# Patient Record
Sex: FEMALE | Race: WHITE | NOT HISPANIC OR LATINO | Employment: UNEMPLOYED | URBAN - METROPOLITAN AREA
[De-identification: names, ages, dates, MRNs, and addresses within clinical notes are randomized per-mention and may not be internally consistent; named-entity substitution may affect disease eponyms.]

---

## 2024-05-24 ENCOUNTER — LACTATION CONSULT (OUTPATIENT)
Dept: LACTATION | Facility: CLINIC | Age: 29
End: 2024-05-24
Payer: COMMERCIAL

## 2024-05-24 DIAGNOSIS — O92.70 LACTATION PROBLEM (HHS-HCC): Primary | ICD-10-CM

## 2024-05-24 NOTE — LACTATION NOTE
Lactation Counseling Note    Subjective:    Kaycee Jay is a 28 y.o. patient referred for lactation counseling. She is here today due to Hyperlactation and post frenototmy . She was referred by her  self .     OB HISTORY:   Baby Name: Siri  Healthcare Provider: [facility]  /Para: : 2  Para: 2  Weeks Gestation: 39  Mode of Delivery: Normal vaginal route  Induction/Augmentation  Epidural/General Anesthesia  Delivery Complications: None  Maternal Complications: None   Information: Baby's Name: Siri  : 24  Place of Birth: [facility]  Healthcare Provider: Less  Skin to skin contact: First hour  Birth weight: 6#14oz    Objective:    BREASTFEEDING ASSESSMENT:   Physical Exam    Baby Name: Siri  Breast Assessment: Large  Nipple Assessment/Stage: intact n/a  Areola: Normal  Feeding Position: baby - led, cradle, cross - cradle, and one side per feeding  Latch: minimal assistance is needed, instructed on deep latch, areolar attachment, sucking and swallowing, and frequent audible swallows  Suck/Feeding: sustained and choking/gulping  Infant Feeding Method: Breast milk only  Infant Behavior: quiet alert and content after feeding  Infant Information: Post status frenotomy  Weight: Weight before feedin#4.2oz  Weight after feedin#7oz  Amount of breast milk transferred: 3oz ml    PATIENT DISCUSSION SUMMARY:Mom here with 1 month old baby for follow up, post frenotomy. Reviewed stretches, per mother's request. Mom c/o baby fussy anf gassy. Has been pumping prior to feeds at recommendation of  in [place]. Has hx of oversupply and fast letdown. Educated re: breastmilk supply/demand. Suggested she try hand expressing/hand pump just unitl letdown occurs, or take baby off breast at signs of letdown then re-latch. Also discussed laidback nusring. Baby tx 3 oz at breast. Discussed diet concerns, mom is dairy free. Offered support and encouragement. Follow up Tuesday,   breastfeeding on demand.    LACTATION PROBLEMS AND STRATEGIES:  Foremilk/Hindmilk imbalance: Express for 2-3 minutes before putting baby on breast  Feed from only one breast at each nursing session  Make sure baby finishes first breast before switching to second. Do not monitor by the clock  Watch for symptoms: Stools are bright green and watery, baby is fussy, gassy, and may have poor weight gain  N/a  Hyperlactation: Avoid long periods between feedings  Block feeding -offer same breast for 3 hours  Burp baby frequently and pace feeding  Consult with physician concerning lactation-inhibiting drugs ( Sudafed 60 mg, Benadryl)  Decrease the frequency of feedings  Discuss use of pseudoephedrine with physician  Drink peppermint tea to lower breast milk supply  If breast uncomfortable, express just enough to soften  Lean back or use reclining position during feedings  Massage any plugged milk ducts during feeding  P1-164 Breastfeeding: Plugged Milk Ducts/Mastitis given  Place baby in upright position facing your breast  Refer mother to health provider  Use only one breast at each feeding  Use the same breast for several feedings  Utilize Australian hold as a method to slow the flow of milk at the breast through decreasing the gravity factor  Utilize a nipple shield to funnel the milk at the breast so baby can keep up with the flow  Utilize paced feeding allowing the baby to come off the breast to catch her breath as needed  PATIENT INSTRUCTION HANDOUTS GIVEN:   N/a  LACTATION EDUCATION:  Waking Techniques, Correct Positioning, and Correct Latch On

## 2024-05-28 ENCOUNTER — LACTATION CONSULT (OUTPATIENT)
Dept: LACTATION | Facility: CLINIC | Age: 29
End: 2024-05-28
Payer: COMMERCIAL

## 2024-05-28 PROCEDURE — 99211 OFF/OP EST MAY X REQ PHY/QHP: CPT | Performed by: EMERGENCY MEDICINE

## 2024-05-29 ENCOUNTER — LACTATION CONSULT (OUTPATIENT)
Dept: LACTATION | Facility: CLINIC | Age: 29
End: 2024-05-29
Payer: COMMERCIAL

## 2024-05-29 DIAGNOSIS — O92.70 LACTATION PROBLEM (HHS-HCC): Primary | ICD-10-CM

## 2024-05-29 PROCEDURE — 99211 OFF/OP EST MAY X REQ PHY/QHP: CPT | Performed by: EMERGENCY MEDICINE

## 2024-05-29 NOTE — PATIENT INSTRUCTIONS
Mom here for follow up wound check and weight check.  Wound healing well rev stretches again rev prescribed serapeptase application per dentist to prevent scar tissue.  Infant with difficulty latching due to full breast rev compression until let down and baby latched easier.  Baby transfer 4.8 oz from both breasts.  Mom returning to [place] tomorrow mom to call with questions and follow up with peds for wt checks

## 2024-05-29 NOTE — PROGRESS NOTES
Lactation Counseling Note    Subjective:    Kaycee Jay is a 28 y.o. patient referred for lactation counseling. She is here today due to Latch issues. She was referred by her  self .     OB HISTORY:   Mode of Delivery: Normal vaginal route    Objective:    BREASTFEEDING ASSESSMENT:   Physical Exam    Breast Assessment: Large, Symmetrical, Full, Soft, Warm, and Compressible  Nipple Assessment/Stage: intact, short, rounded after feeding, and sore Stage I - Pain or irritation with no skin breakdown  Areola: Normal  Feeding Position: baby - led, cross - cradle, laid back, skin to skin, one side per feeding, and mother demonstrates good positioning  Latch: minimal assistance is needed, instructed on deep latch, deep latch obtained, mouth open/moves head side to side, wide open mouth < 160, comfortable with no pain, sucking and swallowing, flanged lips, chin moves in rhythmic motion, comfortable latch, and frequent audible swallows  Suck/Feeding: sustained, audible swallowing, and content after feeding  Infant Feeding Method: Breast milk only  Infant Behavior: awake, active alert, readiness to feed, feeding cues observed, sucking, and content after feeding  Infant Information: Post status frenotomy  Receding chin  Poor occlusion of lips around areola  Tongue protrudes over alveolar ridge  Good cupping of tongue  Good lateral movement of the tongue  Nipple Pain: Pain scale 0-10 (10 most pain): 0  Weight: Weight before feedinlbs 13.3oz  Weight after feedinlbs 2.1oz  Amount of breast milk transferred: 4.8oz ml  Number of voids in the last 24 hours: 8-9  Number of stools in the last 24 hours: 4-6    PATIENT DISCUSSION SUMMARY:  Mom here for follow up wound check and weight check.  Wound healing well rev stretches again rev prescribed serapeptase application per dentist to prevent scar tissue.  Infant with difficulty latching due to full breast rev compression until let down and baby latched easier.  Baby transfer 4.8  oz from both breasts.  Mom returning to [place] tomorrow mom to call with questions and follow up with peds for wt checks  LACTATION PROBLEMS AND STRATEGIES:  Problems latching baby to breast: Baby should latch to areola (dark area) not just the nipple.  Baby's lips should be flanged outward.  Bring the baby up to the level of your breast by putting a pillow under the baby.  Do not use bottles or pacifiers until latching on well.  Dribble milk over the nipple or express milk so that baby can taste it  Encourage a deeper latch with the asymetrical latch- lining baby's nose opposite mother's nipple.  Encourage nipple to stand up prior to feeing by pumping, nipple rolling or by brief application of ice.  Gently tickle your baby's lip with your nipple to encourage your baby to open his/her mouth wide.  Hold baby so that your breast is positioned deep in the baby's mouth.  Hold your baby close, tummy to tummy.  If baby does not latch to breast, express breast milk.  If engorged, express some milk to soften breast.  If the baby is not latched on well, break seal and gently try again.  Keep baby skin to skin and watch for feeding cues.  Massage breast to start milk-ejection reflex.  Place nipple and at least 1 inch of areola into baby's mouth.  Place your hand behind the baby's neck and shoulder.  Do not force baby's head into breast.  Put baby in the football hold or clutch hold, supporting his/her neck and head in sniffing position to open his/her throat.  Shape breast into oval shape (sandwich hold)  for deep latch.  Try different feeding positions (cradle, football, side etc.).  Use a breast feeding pillow to bring baby up to the level of the breast.  Use nipple shield and monitor baby's weight gain and output.  Use semi-reclined feeding position to allow baby to take an active role and trigger baby's inborn feeding behavior.  Use your hand to support your breast during feeding.  When your baby's mouth is wide open,  quickly pull baby into your breast.  Your baby's chin should be pressed into your breast.  PATIENT INSTRUCTION HANDOUTS GIVEN:      LACTATION EDUCATION:  Waking Techniques, Correct Positioning, and Correct Latch On

## 2024-06-07 NOTE — PATIENT INSTRUCTIONS
Lynne Waddell, RN  Registered Nurse  Specialty: Lactation Services     Lactation Note     Signed     Encounter Date: 2024     Signed         Lactation Counseling Note     Subjective:     Kaycee Jay is a 28 y.o. patient referred for lactation counseling. She is here today due to Hyperlactation and post frenototmy . She was referred by her  self .      OB HISTORY:   Baby Name: Siri  Healthcare Provider: OB/GYN [facility]  /Para: : 2  Para: 2  Weeks Gestation: 39  Mode of Delivery: Normal vaginal route  Induction/Augmentation  Epidural/General Anesthesia  Delivery Complications: None  Maternal Complications: None   Information: Baby's Name: Siri  : 24  Place of Birth: [facility]  Healthcare Provider: Less  Skin to skin contact: First hour  Birth weight: 6#14oz     Objective:     BREASTFEEDING ASSESSMENT:   Physical Exam     Baby Name: Siri  Breast Assessment: Large  Nipple Assessment/Stage: intact n/a  Areola: Normal  Feeding Position: baby - led, cradle, cross - cradle, and one side per feeding  Latch: minimal assistance is needed, instructed on deep latch, areolar attachment, sucking and swallowing, and frequent audible swallows  Suck/Feeding: sustained and choking/gulping  Infant Feeding Method: Breast milk only  Infant Behavior: quiet alert and content after feeding  Infant Information: Post status frenotomy  Weight: Weight before feedin#4.2oz  Weight after feedin#7oz  Amount of breast milk transferred: 3oz ml     PATIENT DISCUSSION SUMMARY:Mom here with 1 month old baby for follow up, post frenotomy. Reviewed stretches, per mother's request. Mom c/o baby fussy anf gassy. Has been pumping prior to feeds at recommendation of  in [place]. Has hx of oversupply and fast letdown. Educated re: breastmilk supply/demand. Suggested she try hand expressing/hand pump just unitl letdown occurs, or take baby off breast at signs of letdown then re-latch. Also discussed  laidback nuok. Baby tx 3 oz at breast. Discussed diet concerns, mom is dairy free. Offered support and encouragement. Follow up Tuesday, continue breastfeeding on demand.     LACTATION PROBLEMS AND STRATEGIES:  Foremilk/Hindmilk imbalance: Express for 2-3 minutes before putting baby on breast  Feed from only one breast at each nursing session  Make sure baby finishes first breast before switching to second. Do not monitor by the clock  Watch for symptoms: Stools are bright green and watery, baby is fussy, gassy, and may have poor weight gain  N/a  Hyperlactation: Avoid long periods between feedings  Block feeding -offer same breast for 3 hours  Burp baby frequently and pace feeding  Consult with physician concerning lactation-inhibiting drugs ( Sudafed 60 mg, Benadryl)  Decrease the frequency of feedings  Discuss use of pseudoephedrine with physician  Drink peppermint tea to lower breast milk supply  If breast uncomfortable, express just enough to soften  Lean back or use reclining position during feedings  Massage any plugged milk ducts during feeding  P1-164 Breastfeeding: Plugged Milk Ducts/Mastitis given  Place baby in upright position facing your breast  Refer mother to health provider  Use only one breast at each feeding  Use the same breast for several feedings  Utilize Australian hold as a method to slow the flow of milk at the breast through decreasing the gravity factor  Utilize a nipple shield to funnel the milk at the breast so baby can keep up with the flow  Utilize paced feeding allowing the baby to come off the breast to catch her breath as needed  PATIENT INSTRUCTION HANDOUTS GIVEN:   N/a  LACTATION EDUCATION:  Waking Techniques, Correct Positioning, and Correct Latch On                                                                                                                                          Electronically signed by Lynne Waddell RN at 5/24/2024  3:38 PM         Lactation Consult on  5/24/2024          Note shared with patient  Additional Documentation    Flowsheets: Interfaced Flowsheet Data   Encounter Info: Billing Info,     History,     Allergies     Orders Placed    None  Medication Changes      None  Medication List  Visit Diagnoses      None  Problem List

## 2024-06-26 ENCOUNTER — LACTATION CONSULT (OUTPATIENT)
Dept: LACTATION | Facility: CLINIC | Age: 29
End: 2024-06-26
Payer: COMMERCIAL

## 2024-06-26 DIAGNOSIS — O92.70 LACTATION PROBLEM (HHS-HCC): Primary | ICD-10-CM

## 2024-07-02 NOTE — PROGRESS NOTES
Lactation Counseling Note    Subjective:    Kaycee Jay is a 28 y.o. patient referred for lactation counseling. She is here today due to Latch issues. She was referred by her  self .     OB HISTORY:   Mode of Delivery: Normal vaginal route    Objective:    BREASTFEEDING ASSESSMENT:   Physical Exam    Breast Assessment: Large, Pendulous, Full, Warm, and Compressible  Nipple Assessment/Stage: intact, erect, and rounded after feeding    Areola: Normal  Feeding Position: baby - led, cradle, laid back, skin to skin, one side per feeding, nipple to nose, and mother demonstrates good positioning  Latch: instructed on deep latch, deep latch obtained, optimal angle of mouth opening, comfortable with no pain, bursts of sucking, swallowing, and rest, flanged lips, clenched jaws, and frequent audible swallows  Suck/Feeding: unsustained, audible swallowing, and content after feeding  Alternative Feeding Methods: nursing at the breast  Infant Feeding Method: Breast milk only  Infant Behavior: awake, active alert, fussy, feeding cues observed, and content after feeding  Infant Information: Ankyloglossia  Receding chin  Poor occlusion of lips around areola  Tongue humped/bunched/retracted/elevated  Nipple Pain: Pain scale 0-10 (10 most pain): 0  Weight: Date of pediatrician weight:    Weight before feeding: ***  Weight after feeding: ***  Amount of breast milk transferred: *** ml  Number of voids in the last 24 hours: 8  Number of stools in the last 24 hours: 3    PATIENT DISCUSSION SUMMARY:    LACTATION PROBLEMS AND STRATEGIES:  Fussy baby: Carry baby with his/her tummy down across forearm with hand supporting chest  Check the baby for tight clothing  Frequent burping during feedings may help if baby seem to have gas  Give the baby a gentle message  Increase holding and carrying of baby  Mom and baby may take a warm bath together   Normal fussy period, usually during late afternoon or early evening  Nurse baby at least 10-12  "times per day. Watch for feeding cues. Do not wait until baby cries to feed  Nurse on demand. Try to avoid too rigid of a schedule  Provide lots of time skin to skin with baby  Reduce environmental stimuli (loud sounds, bright lights, etc.)  Swaddle in sleep sack  Turn dryer, vacuum, or shower on for \"white\" noise  PATIENT INSTRUCTION HANDOUTS GIVEN:      LACTATION EDUCATION:  Correct Positioning and Correct Latch On                                                                                            "

## 2024-07-09 ENCOUNTER — LACTATION CONSULT (OUTPATIENT)
Dept: LACTATION | Facility: CLINIC | Age: 29
End: 2024-07-09
Payer: COMMERCIAL

## 2024-07-09 PROCEDURE — 99211 OFF/OP EST MAY X REQ PHY/QHP: CPT | Performed by: EMERGENCY MEDICINE

## 2024-07-09 NOTE — PROGRESS NOTES
Lactation Counseling Note    Subjective:    Kaycee Jay is a 28 y.o. patient referred for lactation counseling. She is here today due to post frenotomy wound check. She was referred by her  self .     OB HISTORY:   Mode of Delivery: Normal vaginal route    Objective:    BREASTFEEDING ASSESSMENT:   Physical Exam    Breast Assessment: Large, Symmetrical, Full, Soft, Warm, and Compressible  Nipple Assessment/Stage: intact, short, and rounded after feeding    Areola: Normal  Feeding Position: baby - led, cradle, laid back, skin to skin, one side per feeding, one sided nursing, nipple to nose, and mother demonstrates good positioning  Latch: latch achieved, comfortable with no pain, sucking and swallowing, flanged lips, comfortable latch, and correct tongue position  Suck/Feeding: sustained, audible swallowing, and content after feeding  Infant Behavior: awake, active alert, readiness to feed, and content after feeding  Nipple Pain: Pain scale 0-10 (10 most pain): 0  Weight: Weight before feedinlbs 12.5oz  Weight after feedinlbs 0.9oz  Amount of breast milk transferred: 4.4oz ml    PATIENT DISCUSSION SUMMARY: Mom here for follow-up weight check and assessment of second frenectomy wound mom states baby was rerevised   States the wound had healed over and was able to release the tongue mom states she notices a difference in baby's gassiness assessed the wound wound peers to be clean and open baby having decreased gas symptoms.  Mom wants to come to assess the wound daily for reattachment.  Baby's weight is up 15 ounces in the past 13 days baby did transfer 4.4 ounces.  Mom to follow-up tomorrow July 10.    LACTATION PROBLEMS AND STRATEGIES:     PATIENT INSTRUCTION HANDOUTS GIVEN:      LACTATION EDUCATION:  Correct Latch On

## 2024-07-10 ENCOUNTER — LACTATION CONSULT (OUTPATIENT)
Dept: LACTATION | Facility: CLINIC | Age: 29
End: 2024-07-10
Payer: COMMERCIAL

## 2024-07-10 PROCEDURE — 99211 OFF/OP EST MAY X REQ PHY/QHP: CPT | Performed by: EMERGENCY MEDICINE

## 2024-07-10 NOTE — PROGRESS NOTES
Lactation Counseling Note    Subjective:    Kaycee Jay is a 28 y.o. patient referred for lactation counseling. She is here today due to  frenectomy wound assessment . She was referred by her  self .     OB HISTORY:   Mode of Delivery: Normal vaginal route    Objective:    BREASTFEEDING ASSESSMENT:   Physical Exam    Breast Assessment: Large, Pendulous, Symmetrical, Full, Soft, Warm, and Compressible  Nipple Assessment/Stage: intact and erect    Areola: Normal  Feeding Position: cradle, cross - cradle, skin to skin, one side per feeding, one sided nursing, and mother demonstrates good positioning  Suck/Feeding: sustained, audible swallowing, choking/gulping, and content after feeding  Alternative Feeding Methods: nursing at the breast  Infant Feeding Method: Breast milk only  Infant Behavior: awake, quiet alert, readiness to feed, and content after feeding  Infant Information: Post status frenotomy  Tongue protrudes over alveolar ridge  Good cupping of tongue  Good lateral movement of the tongue  Able to elevate tongue to roof of mouth  Nipple Pain: Pain scale 0-10 (10 most pain): 0  Weight: Weight before feedin.lbs13.1oz  Weight after feedinlbs 1.0oz  Amount of breast milk transferred: 3.9oz ml    PATIENT DISCUSSION SUMMARY:  Mom here for follow-up weight check and wound assessment post revision baby up 2 ounces in 24 hours mom denies any pain or discomfort with nursing Dimon under tongue appears to be clean with clear edges no attachment noted observed very comfortable feeding mom denies any pain or problems mom states baby is less gassy.  Mom feels that stretches are going well.  Mom wants to follow-up on Monday before she follows up at [facility] on Tuesday.  LACTATION PROBLEMS AND STRATEGIES:     PATIENT INSTRUCTION HANDOUTS GIVEN:      LACTATION EDUCATION:  Correct Latch On

## 2024-07-10 NOTE — PATIENT INSTRUCTIONS
Mom here for follow-up weight check and wound assessment post revision baby up 2 ounces in 24 hours mom denies any pain or discomfort with nursing Dimon under tongue appears to be clean with clear edges no attachment noted observed very comfortable feeding mom denies any pain or problems mom states baby is less gassy.  Mom feels that stretches are going well.  Mom wants to follow-up on Monday before she follows up at [facility] on Tuesday.   left-side extremities/right-side extremities/normal

## 2024-07-12 ENCOUNTER — LACTATION CONSULT (OUTPATIENT)
Dept: LACTATION | Facility: CLINIC | Age: 29
End: 2024-07-12
Payer: COMMERCIAL

## 2024-07-12 DIAGNOSIS — O92.70 LACTATION PROBLEM (HHS-HCC): Primary | ICD-10-CM

## 2024-07-12 PROCEDURE — 99211 OFF/OP EST MAY X REQ PHY/QHP: CPT | Performed by: EMERGENCY MEDICINE

## 2024-07-12 NOTE — PATIENT INSTRUCTIONS
Mom and baby here for follow up post rerevision. Mom states latch comfort much improved. Infant weighed prior to feed at 11 lb 15.7 oz which is gain of 2.6 in 3 days. Aftercare stretches done per mom request. Wound looks appropriately lorena shaped. Observed mom independently latch baby without difficult. Latch comfortable. Infant weighed after one side and transferred 4.1 oz per test weight. Mom states infant usually only nurses on one side per feeding. Infant content after feeding. Mom would like to return Monday 7-15-24 for continued follow up

## 2024-07-12 NOTE — PROGRESS NOTES
Lactation Counseling Note    Subjective:    Kaycee Jay is a 28 y.o. patient referred for lactation counseling. She is here today due to Latch issues. She was referred by her  self, prior patient .     OB HISTORY:   /Para: : 2  Para: 2    Objective:    BREASTFEEDING ASSESSMENT:   Physical Exam    Breast Assessment: Large, Pendulous, Full, Compressible, and Readiness to feed  Nipple Assessment/Stage: intact, short, erect, and rounded after feeding none, improved  Areola: Normal  Feeding Position: baby - led, c - hold, cross - cradle, laid back, skin to skin, one side per feeding, nipple to nose, and mother demonstrates good positioning  Latch: eagerly grasped on to latch, areolar attachment, comfortable with no pain, sucking and swallowing, chin and lower lip contact breast first, chin moves in rhythmic motion, comfortable latch, and correct tongue position  Suck/Feeding: sustained, coordinated suck/swallow/breathe, audible swallowing, audible swallowing with stimulation, and content after feeding  Nipple Pain: none  Weight: Weight before feedin lb 15.7 oz  Weight after feedin lb 3.8 oz  Amount of breast milk transferred: 4.1 oz ml    PATIENT DISCUSSION SUMMARY: Mom and baby here for follow up post rerevision. Mom states latch comfort much improved. Infant weighed prior to feed at 11 lb 15.7 oz which is gain of 2.6 in 3 days. Aftercare stretches done per mom request. Wound looks appropriately lorena shaped. Observed mom independently latch baby without difficult. Latch comfortable. Infant weighed after one side and transferred 4.1 oz per test weight. Mom states infant usually only nurses on one side per feeding. Infant content after feeding. Mom would like to return Monday 7-15-24 for continued follow up    LACTATION PROBLEMS AND STRATEGIES:  Problems latching baby to breast: Baby should latch to areola (dark area) not just the nipple.  Baby's lips should be flanged outward.  Bring the baby  up to the level of your breast by putting a pillow under the baby.  Do not use bottles or pacifiers until latching on well.  Dribble milk over the nipple or express milk so that baby can taste it  Encourage a deeper latch with the asymetrical latch- lining baby's nose opposite mother's nipple.  Encourage nipple to stand up prior to feeing by pumping, nipple rolling or by brief application of ice.  Gently tickle your baby's lip with your nipple to encourage your baby to open his/her mouth wide.  Hold baby so that your breast is positioned deep in the baby's mouth.  Hold your baby close, tummy to tummy.  If baby does not latch to breast, express breast milk.  If engorged, express some milk to soften breast.  If the baby is not latched on well, break seal and gently try again.  Keep baby skin to skin and watch for feeding cues.  Massage breast to start milk-ejection reflex.  Place nipple and at least 1 inch of areola into baby's mouth.  Place your hand behind the baby's neck and shoulder.  Do not force baby's head into breast.  Put baby in the football hold or clutch hold, supporting his/her neck and head in sniffing position to open his/her throat.  Shape breast into oval shape (sandwich hold)  for deep latch.  Try different feeding positions (cradle, football, side etc.).  Use a breast feeding pillow to bring baby up to the level of the breast.  Use nipple shield and monitor baby's weight gain and output.  Use semi-reclined feeding position to allow baby to take an active role and trigger baby's inborn feeding behavior.  Use your hand to support your breast during feeding.  When your baby's mouth is wide open, quickly pull baby into your breast.  Your baby's chin should be pressed into your breast.  PATIENT INSTRUCTION HANDOUTS GIVEN:      LACTATION EDUCATION:  Correct Positioning and Correct Latch On

## 2024-07-15 ENCOUNTER — LACTATION CONSULT (OUTPATIENT)
Dept: LACTATION | Facility: CLINIC | Age: 29
End: 2024-07-15
Payer: COMMERCIAL

## 2024-07-15 PROCEDURE — 99211 OFF/OP EST MAY X REQ PHY/QHP: CPT | Performed by: EMERGENCY MEDICINE

## 2024-07-16 NOTE — PATIENT INSTRUCTIONS
Mom here for follow-up post revision of frenectomy.  Wound appears tight and closed helped mom with a deep stretch wound open the easily.  Mom feels symptoms of gassiness have improved.  Has been concerns why wound keeps closing very quickly.  Mom has follow-up with [facility] Tuesday, July 16 baby quickly transferred 5.7 ounces from 1 breast.  Baby refuses to the second breast.  Mom to follow-up as needed

## 2024-07-16 NOTE — PROGRESS NOTES
Lactation Counseling Note    Subjective:    Kaycee Jay is a 28 y.o. patient referred for lactation counseling. She is here today due to  wound assessment . She was referred by her  self .     OB HISTORY:   Mode of Delivery: Normal vaginal route    Objective:    BREASTFEEDING ASSESSMENT:   Physical Exam    Breast Assessment: Large, Pendulous, Symmetrical, Full, Soft, Warm, and Compressible  Nipple Assessment/Stage: intact and erect    Areola: Normal  Feeding Position: baby - led, breast sandwich, cradle, skin to skin, one sided nursing, and nipple to nose  Latch: deep latch obtained, comfortable with no pain, sucks with long jaw movement, flanged lips, and frequent audible swallows  Suck/Feeding: sustained, audible swallowing, and content after feeding  Alternative Feeding Methods: nursing at the breast  Infant Feeding Method: Breast milk only  Infant Behavior: awake, quiet alert, and content after feeding  Infant Information: Post status frenotomy  Good cupping of tongue  Good lateral movement of the tongue  Able to elevate tongue to roof of mouth  Nipple Pain: Pain scale 0-10 (10 most pain): 0  Weight: Weight before feedinlbs 2.1oz  Weight after feedinlbs 7.8oz  Amount of breast milk transferred: 5.7 ml    PATIENT DISCUSSION SUMMARY: Mom here for follow-up post revision of frenectomy.  Wound appears tight and closed helped mom with a deep stretch wound open the easily.  Mom feels symptoms of gassiness have improved.  Has been concerns why wound keeps closing very quickly.  Mom has follow-up with [facility]  baby quickly transferred 5.7 ounces from 1 breast.  Baby refuses to the second breast.  Mom to follow-up as needed.    LACTATION PROBLEMS AND STRATEGIES:  Fast healing frenotomy  PATIENT INSTRUCTION HANDOUTS GIVEN:      LACTATION EDUCATION:  Correct Positioning and Correct Latch On

## 2024-07-17 ENCOUNTER — LACTATION CONSULT (OUTPATIENT)
Dept: LACTATION | Facility: CLINIC | Age: 29
End: 2024-07-17
Payer: COMMERCIAL

## 2024-07-17 DIAGNOSIS — O92.70 LACTATION PROBLEM (HHS-HCC): Primary | ICD-10-CM

## 2024-07-17 NOTE — PATIENT INSTRUCTIONS
Pt here for weight check. Baby had 2nd laser revision at [facility] about a week ago. Pt saw them yesterday and they did a deep stretch and showed pt how to do deeper stretch with one finger. Baby weighs 12lbs 5.5oz. Baby transferred 2.9oz from one breast today. Today, assisted with stretches and slight bleeding noted at the wound. Appears to be healing well. Pt is going back home to [place] today and will follow up here as needed in the future. She is scheduled with lactation in [place] will follow closely.